# Patient Record
Sex: FEMALE | Race: WHITE
[De-identification: names, ages, dates, MRNs, and addresses within clinical notes are randomized per-mention and may not be internally consistent; named-entity substitution may affect disease eponyms.]

---

## 2017-10-29 ENCOUNTER — HEALTH MAINTENANCE LETTER (OUTPATIENT)
Age: 32
End: 2017-10-29

## 2018-11-29 ENCOUNTER — VIRTUAL VISIT (OUTPATIENT)
Dept: FAMILY MEDICINE | Facility: OTHER | Age: 33
End: 2018-11-29

## 2018-11-30 NOTE — PROGRESS NOTES
"Date:   Clinician: Francisco Patel  Clinician NPI: [id number]  Patient: Negrita Tenorio  Patient : 1985  Patient Address: [address] [place] [address]  Patient Phone: [phone]  Visit Protocol: Yeast infection  Patient Summary:  Negrita is a 33 year old ( : 1985 ) female who initiated a Visit for a presumed vaginal yeast infection. When asked the question \"Please sign me up to receive news, health information and promotions from [facility].\", Negrita responded \"No\".    Negrita began noticing vaginal discharge, vaginal pruritus, and perivulvar pruritus 5-10 days ago. She has a more than normal amount of thick, clear or white, non-odorous, smooth discharge.   She denies having abdominal pain, perivulvar rash, and open sores. She also denies feeling feverish.   Negrita has a history of vaginal yeast infections. She has had zero (0) occurrences in the past year and the current symptoms are similar to previous yeast infections. She has not used fluconazole (Diflucan) to treat previous yeast infections.   She has attempted to treat her symptoms with anti-fungal cream for yeast infections and has used the anti-fungal medication as directed. Anti-fungal medication used to treat symptoms as reported by the patient (free text): Monistat 3 day   She has no medication preference.   She denies taking antibiotics in the past 2 weeks. She denies having a sexually transmitted disease.   She denies pregnancy and is breastfeeding. Her last period was over a month ago.   She does NOT smoke or use smokeless tobacco.   Additional information as reported by the patient (free text): Anal itch as well    MEDICATIONS: No current medications, ALLERGIES: NKDA  Clinician Response:  Dear Negrita,  Based on the information you have provided, you likely have a vaginal yeast infection which is a common infection of the vagina caused by a fungus.  I am prescribing:   Fluconazole (Diflucan) 150 mg " oral tablet. Take 1 tablet by mouth in a single dose. There are no refills with this prescription.  While you have yeast infection symptoms, do the following:      Avoid irritants such as scented bath products, tampons, pads, or vaginal sprays and powders.    Avoid douching.    Wear cotton underwear and if you are comfortable doing so, do not wear underwear to bed.    Avoid hot tubs and whirlpool spas.     Symptoms should improve with 1-2 days of treatment and resolve entirely in 5-7 days. However, there are other types of vaginal infections that have some of the same symptoms as a yeast infection. For this reason, please be seen in person if symptoms have not improved after 3 days or resolved after 10 days.   Diagnosis: Candida vulvovaginitis  Diagnosis ICD: B37.3  Prescription: fluconazole (Diflucan) 150 mg oral tablet 1 tablet, 1 days supply. Take 1 tablet by mouth in a single dose. Refills: 0, Refill as needed: no, Allow substitutions: yes  Pharmacy: [id number] - [phone] - [id number], [place] [address]

## 2018-12-17 ENCOUNTER — VIRTUAL VISIT (OUTPATIENT)
Dept: FAMILY MEDICINE | Facility: OTHER | Age: 33
End: 2018-12-17

## 2018-12-17 NOTE — PROGRESS NOTES
"Date:   Clinician: Darryl Elizondo  Clinician NPI: [id number]  Patient: Negrita Tenorio  Patient : 1985  Patient Address: [address] [place] [address]  Patient Phone: [phone]  Visit Protocol: General skin conditions  Patient Summary:  Negrita is a 33 year old ( : 1985 ) female who initiated a Visit for evaluation of an unspecified skin condition. When asked the question \"Please sign me up to receive news, health information and promotions from [employer].\", Negrita responded \"No\".    Images of her skin condition were not required due to its location.  Her symptoms started 3-4 weeks ago and affect both sides of her body. The skin condition is located on her buttocks. The skin condition is red in color.   It feels tender to touch, burning, painful, and itchy. The symptoms do not interfere with her sleep.   Symptom details     Redness: The redness has not rapidly increased in size.    Pain: The pain is moderate (between 4-6 on a 10 point pain scale).     The skin condition has changed since the symptoms started. Description of changes as reported by the patient (free text): More irritated, itchy   Denied symptoms include scabs, pimples, sores, numbness, crusts, hives, dry/flaky skin, blisters, warm to touch, and drainage. Negrita does not feel feverish. She does not have a rash in the shape of a bull's-eye.   Treatments or home remedies used to relieve the symptoms as reported by the patient (free text): Yeast Infection Antibiotics   Precipitating events   Negrita did not come in contact with any irritants prior to the onset of her symptoms and has not been in close contact with anyone that has similar symptoms. She also did not spend time in a wooded area, swim, travel, or spend excess time in the sun just before her symptoms started. Negrita did not get bitten or stung by an insect.   Pertinent medical history  Negrita has experienced this skin condition before. Her " current skin condition does not come and go. The last time she experienced this skin condition was more than a year ago.   Negrita has had chickenpox, but has not had shingles in the past.    Negrita does not have a history of atopia. Ongoing medical conditions were denied.   Weight: 145 lbs   Negrita does not smoke or use smokeless tobacco.   She denies pregnancy and is breastfeeding. Her last period was over a month ago.   Additional information as reported by the patient (free text): I have hemorrhoids and the itching is on my anus and surrounding area.   MEDICATIONS: No current medications, ALLERGIES: NKDA  Clinician Response:  Dear Negrita,   Based on the information provided, you have a rash without a clear cause. A rash can be caused by diseases, irritating substances, allergies, and your genetics.   Although some skin rashes have a distinct appearance, many rash symptoms do not point to a specific cause. As long as symptoms are not a sign of a serious condition, treatment focuses on controlling symptoms.  Medication information  Unless you are allergic to the over-the-counter medication(s) below, I recommend using:     Hydrocortisone (Cortaid or store brand) 1% topical ointment. Apply to the affected area as needed to reduce itching.   Over-the-counter medications do not require a prescription. Ask the pharmacist if you have any questions.  Self care  Steps you can take to be as comfortable as possible:     Avoid scratching the rash    Apply a cool, wet washcloth to your rash for 15 minutes several times a day    Take a lukewarm bath to soothe the skin (adding colloidal oatmeal can help even more)    Apply a moisturizing lotion immediately after bathing and frequently reapply throughout the day    Use mild soap and laundry detergent    Choose clothing and bedding made of a breathable material like cotton    Do not use antibiotic creams or ointments unless recommended by a provider     When to seek care   Please make an appointment to be seen in a clinic or urgent care if any of the following occur:     Symptoms do not improve after 7 days of treatment    New symptoms develop, or symptoms become worse    Symptoms are so severe that you are unable to sleep or do regular activities    You have areas of broken skin from scratching    You notice symptoms of a skin infection (spreading redness, pain that is not improving, fever, warmth)      Diagnosis: Rash and other nonspecific skin eruption  Diagnosis ICD: R21  Diagnosis ICD: 462.0

## 2020-03-01 ENCOUNTER — HEALTH MAINTENANCE LETTER (OUTPATIENT)
Age: 35
End: 2020-03-01

## 2020-12-14 ENCOUNTER — HEALTH MAINTENANCE LETTER (OUTPATIENT)
Age: 35
End: 2020-12-14

## 2021-04-17 ENCOUNTER — HEALTH MAINTENANCE LETTER (OUTPATIENT)
Age: 36
End: 2021-04-17

## 2021-10-02 ENCOUNTER — HEALTH MAINTENANCE LETTER (OUTPATIENT)
Age: 36
End: 2021-10-02

## 2022-05-14 ENCOUNTER — HEALTH MAINTENANCE LETTER (OUTPATIENT)
Age: 37
End: 2022-05-14

## 2022-09-03 ENCOUNTER — HEALTH MAINTENANCE LETTER (OUTPATIENT)
Age: 37
End: 2022-09-03

## 2023-06-02 ENCOUNTER — HEALTH MAINTENANCE LETTER (OUTPATIENT)
Age: 38
End: 2023-06-02